# Patient Record
Sex: MALE | Race: WHITE | NOT HISPANIC OR LATINO | Employment: FULL TIME | URBAN - METROPOLITAN AREA
[De-identification: names, ages, dates, MRNs, and addresses within clinical notes are randomized per-mention and may not be internally consistent; named-entity substitution may affect disease eponyms.]

---

## 2022-04-07 ENCOUNTER — OFFICE VISIT (OUTPATIENT)
Dept: INTERNAL MEDICINE | Facility: CLINIC | Age: 47
End: 2022-04-07

## 2022-04-07 VITALS
DIASTOLIC BLOOD PRESSURE: 92 MMHG | OXYGEN SATURATION: 96 % | TEMPERATURE: 97.1 F | HEART RATE: 92 BPM | SYSTOLIC BLOOD PRESSURE: 166 MMHG | WEIGHT: 297 LBS | RESPIRATION RATE: 22 BRPM | BODY MASS INDEX: 40.23 KG/M2 | HEIGHT: 72 IN

## 2022-04-07 DIAGNOSIS — F41.9 ANXIETY AND DEPRESSION: ICD-10-CM

## 2022-04-07 DIAGNOSIS — F32.A ANXIETY AND DEPRESSION: ICD-10-CM

## 2022-04-07 DIAGNOSIS — I10 PRIMARY HYPERTENSION: Primary | ICD-10-CM

## 2022-04-07 DIAGNOSIS — K21.00 GASTROESOPHAGEAL REFLUX DISEASE WITH ESOPHAGITIS WITHOUT HEMORRHAGE: ICD-10-CM

## 2022-04-07 PROCEDURE — 99204 OFFICE O/P NEW MOD 45 MIN: CPT | Performed by: NURSE PRACTITIONER

## 2022-04-07 RX ORDER — OMEPRAZOLE 20 MG/1
20 CAPSULE, DELAYED RELEASE ORAL DAILY
COMMUNITY
End: 2022-04-07

## 2022-04-07 RX ORDER — ESCITALOPRAM OXALATE 10 MG/1
10 TABLET ORAL DAILY
Qty: 30 TABLET | Refills: 2 | Status: SHIPPED | OUTPATIENT
Start: 2022-04-07

## 2022-04-07 RX ORDER — LISINOPRIL 10 MG/1
10 TABLET ORAL DAILY
Qty: 30 TABLET | Refills: 2 | Status: SHIPPED | OUTPATIENT
Start: 2022-04-07

## 2022-04-07 RX ORDER — OMEPRAZOLE 40 MG/1
40 CAPSULE, DELAYED RELEASE ORAL DAILY
Qty: 90 CAPSULE | Refills: 0 | Status: SHIPPED | OUTPATIENT
Start: 2022-04-07

## 2022-04-07 NOTE — PROGRESS NOTES
New Patient Office Visit      Patient Name: Charles Aviles  : 1975   MRN: [id number]     Chief Complaint:    Chief Complaint   Patient presents with   • Anxiety     New patient   • Hypertension       History of Present Illness: Charles Aviles is a 46 y.o. male presents to clinic today to establish care.      Former PCP-[place] Dr. Quezada      Alcoholism  Patient was in 60 day treatment August and 2021. Patient has been sober since. Once he got out he stopped all his medications including bp medication, cholesterol medication, zoloft, and valium.       Hypertension  Patient hasn't taken medication since 2021 and bp was stable. The patient denies headaches, chest pain, dyspnea, edema, syncope, blurred vision or palpitations. He had tolerated them well in the past.     GERD  The patient reports a long history of GERD; history of Ravinder's esophagus. EGD completed . Patient has been taking omeprazole 20 mg daily otc; patient's symptoms have been controlled with once a day dosing. He was prescribed omeprazole 20 mg BID in the past. He had not seen PCP to get it covered.      Pain  Location: full length of left arm and left leg pain  Severity: mild-moderate   Onset Quality: sudden  Duration: Couple times a week; lasts seconds   Timing:  Intermittent  Chronicity: couple of weeks  Quality: sharp shooting pain   Aggravating factors: occurs mainly during sleep  Alleviating factors: goes away on its own; no therapies attempted.      Associated symptoms: intermittent numbness in tingling in left hand   Risk factors: lower back problems      Patient has complaints of depression due to family stressors.  Symptoms include feeling down and depressed, loss of interest in things they once enjoyed, change in eating, sleep disturbances, anxiousness, and hopelessness. He denies any thoughts of self-harm or plan to hurt himself.    Subjective     Review of System: Review of Systems    Constitutional: Negative for fatigue and fever.   HENT: Negative for congestion and rhinorrhea.    Respiratory: Negative for shortness of breath and wheezing.    Cardiovascular: Negative for chest pain and palpitations.   Gastrointestinal: Negative for abdominal pain, diarrhea, nausea and vomiting.   Genitourinary: Negative for dysuria.   Musculoskeletal: Negative for myalgias.   Skin: Negative for rash.   Neurological: Negative for headaches.   Hematological: Negative for adenopathy.   Psychiatric/Behavioral: Negative for dysphoric mood.          Past Medical History:   Past Medical History:   Diagnosis Date   • Anxiety    • Colon polyp    • Depression    • GERD (gastroesophageal reflux disease)    • History of medical problems    • Hypertension        Past Surgical History: History reviewed. No pertinent surgical history.    Family History:   Family History   Problem Relation Age of Onset   • Thyroid disease Mother    • Liver disease Father    • Hyperlipidemia Father    • Hypertension Father    • Arthritis Father    • Diabetes Father    • Heart disease Paternal Uncle    • Cancer Maternal Grandfather        Social History:   Social History     Socioeconomic History   • Marital status:    Tobacco Use   • Smoking status: Former Smoker     Packs/day: 1.00     Years: 15.00     Pack years: 15.00     Types: Cigarettes     Start date: 1991     Quit date: 2021     Years since quittin.6   • Smokeless tobacco: Current User     Types: Chew   • Tobacco comment: I chew snuff   Substance and Sexual Activity   • Alcohol use: Not Currently     Comment: in recovery 7 months clean   • Drug use: Never   • Sexual activity: Not Currently     Partners: Female     Birth control/protection: Condom       Medications:     Current Outpatient Medications:   •  lisinopril (PRINIVIL,ZESTRIL) 10 MG tablet, Take 1 tablet by mouth Daily., Disp: 30 tablet, Rfl: 2  •  omeprazole (priLOSEC) 40 MG capsule, Take 1 capsule by  "mouth Daily., Disp: 90 capsule, Rfl: 0    Allergies:   No Known Allergies    Objective     Physical Exam:   Vital Signs:   Vitals:    04/07/22 1429   BP: 166/92   BP Location: Right arm   Patient Position: Sitting   Cuff Size: Adult   Pulse: 92   Resp: 22   Temp: 97.1 °F (36.2 °C)   SpO2: 96%   Weight: 135 kg (297 lb)   Height: 181.6 cm (71.5\")   PainSc: 0-No pain     Body mass index is 40.85 kg/m². Patient's Body mass index is 40.85 kg/m². indicating that he is {weight categories:35177}.      Physical Exam  Constitutional:       General: He is not in acute distress.     Appearance: He is not ill-appearing.   HENT:      Head: Normocephalic.   Cardiovascular:      Rate and Rhythm: Normal rate and regular rhythm.      Heart sounds: Normal heart sounds. No murmur heard.  Pulmonary:      Breath sounds: Normal breath sounds.   Abdominal:      General: Bowel sounds are normal.      Tenderness: There is no abdominal tenderness.   Musculoskeletal:         General: No swelling or tenderness. Normal range of motion.   Lymphadenopathy:      Cervical: No cervical adenopathy.   Skin:     General: Skin is warm and dry.   Neurological:      General: No focal deficit present.      Mental Status: He is oriented to person, place, and time.   Psychiatric:         Mood and Affect: Mood normal.         Assessment / Plan      Assessment/Plan:   Diagnoses and all orders for this visit:    1. Primary hypertension (Primary)  -     lisinopril (PRINIVIL,ZESTRIL) 10 MG tablet; Take 1 tablet by mouth Daily.  Dispense: 30 tablet; Refill: 2  Your blood pressure was elevated today. Monitor at home and record. Bring to next appointment. If BP is >140/90 then f/u sooner.     2. Gastroesophageal reflux disease with esophagitis without hemorrhage  -     omeprazole (priLOSEC) 40 MG capsule; Take 1 capsule by mouth Daily.  Dispense: 90 capsule; Refill: 0  -     Ambulatory Referral to Gastroenterology         Follow Up:   Return in about 3 months (around " 7/7/2022) for Annual.    KATIE Plata  [facility]

## 2022-04-11 NOTE — PROGRESS NOTES
New Patient Office Visit      Patient Name: Charles Aviles  : 1975   MRN: [id number]     Chief Complaint:    Chief Complaint   Patient presents with   • Anxiety     New patient   • Hypertension       History of Present Illness: Charles Aviles is a 46 y.o. male presents to clinic today to establish care.      Former PCP-[place] Dr. Quezada      Alcoholism  Patient was in 60 day treatment August and 2021. Patient has been sober since. Once he got out he stopped all his medications including bp medication, cholesterol medication, zoloft, and valium.       Hypertension  Patient hasn't taken medication since 2021 and bp was stable. The patient denies headaches, chest pain, dyspnea, edema, syncope, blurred vision or palpitations. He had tolerated them well in the past.     GERD  The patient reports a long history of GERD; history of Ravinder's esophagus. EGD completed . Patient has been taking omeprazole 20 mg daily otc; patient's symptoms have been controlled with once a day dosing. He was prescribed omeprazole 20 mg BID in the past. He had not seen PCP to get it covered.      Pain  Location: full length of left arm and left leg pain  Severity: mild-moderate   Onset Quality: sudden  Duration: Couple times a week; lasts seconds   Timing:  Intermittent  Chronicity: couple of weeks  Quality: sharp shooting pain   Aggravating factors: occurs mainly during sleep  Alleviating factors: goes away on its own; no therapies attempted.      Associated symptoms: intermittent numbness in tingling in left hand   Risk factors: lower back problems      Patient has complaints of depression due to family stressors.  Symptoms include feeling down and depressed, loss of interest in things they once enjoyed, change in eating, sleep disturbances, anxiousness, and hopelessness. He denies any thoughts of self-harm or plan to hurt himself.    Subjective     Review of System: Review of Systems    Constitutional: Negative for fatigue and fever.   HENT: Negative for congestion and rhinorrhea.    Respiratory: Negative for shortness of breath and wheezing.    Cardiovascular: Negative for chest pain and palpitations.   Gastrointestinal: Negative for abdominal pain, diarrhea, nausea and vomiting.   Genitourinary: Negative for dysuria.   Musculoskeletal: Negative for myalgias.   Skin: Negative for rash.   Neurological: Negative for headaches.   Hematological: Negative for adenopathy.   Psychiatric/Behavioral: Negative for dysphoric mood.          Past Medical History:   Past Medical History:   Diagnosis Date   • Anxiety    • Colon polyp    • Depression    • GERD (gastroesophageal reflux disease)    • History of medical problems    • Hypertension        Past Surgical History: History reviewed. No pertinent surgical history.    Family History:   Family History   Problem Relation Age of Onset   • Thyroid disease Mother    • Liver disease Father    • Hyperlipidemia Father    • Hypertension Father    • Arthritis Father    • Diabetes Father    • Heart disease Paternal Uncle    • Cancer Maternal Grandfather        Social History:   Social History     Socioeconomic History   • Marital status:    Tobacco Use   • Smoking status: Former Smoker     Packs/day: 1.00     Years: 15.00     Pack years: 15.00     Types: Cigarettes     Start date: 1991     Quit date: 2021     Years since quittin.6   • Smokeless tobacco: Current User     Types: Chew   • Tobacco comment: I chew snuff   Substance and Sexual Activity   • Alcohol use: Not Currently     Comment: in recovery 7 months clean   • Drug use: Never   • Sexual activity: Not Currently     Partners: Female     Birth control/protection: Condom       Medications:     Current Outpatient Medications:   •  escitalopram (Lexapro) 10 MG tablet, Take 1 tablet by mouth Daily., Disp: 30 tablet, Rfl: 2  •  lisinopril (PRINIVIL,ZESTRIL) 10 MG tablet, Take 1 tablet by  "mouth Daily., Disp: 30 tablet, Rfl: 2  •  omeprazole (priLOSEC) 40 MG capsule, Take 1 capsule by mouth Daily., Disp: 90 capsule, Rfl: 0    Allergies:   No Known Allergies    Objective     Physical Exam:   Vital Signs:   Vitals:    04/07/22 1429   BP: 166/92   BP Location: Right arm   Patient Position: Sitting   Cuff Size: Adult   Pulse: 92   Resp: 22   Temp: 97.1 °F (36.2 °C)   SpO2: 96%   Weight: 135 kg (297 lb)   Height: 181.6 cm (71.5\")   PainSc: 0-No pain           Physical Exam  Constitutional:       General: He is not in acute distress.     Appearance: He is not ill-appearing.   HENT:      Head: Normocephalic.   Cardiovascular:      Rate and Rhythm: Normal rate and regular rhythm.      Heart sounds: Normal heart sounds. No murmur heard.  Pulmonary:      Breath sounds: Normal breath sounds.   Abdominal:      General: Bowel sounds are normal.      Tenderness: There is no abdominal tenderness.   Musculoskeletal:         General: No swelling or tenderness. Normal range of motion.   Lymphadenopathy:      Cervical: No cervical adenopathy.   Skin:     General: Skin is warm and dry.   Neurological:      General: No focal deficit present.      Mental Status: He is oriented to person, place, and time.   Psychiatric:         Mood and Affect: Mood normal.         Assessment / Plan      Assessment/Plan:   Diagnoses and all orders for this visit:    1. Primary hypertension (Primary)  -     lisinopril (PRINIVIL,ZESTRIL) 10 MG tablet; Take 1 tablet by mouth Daily.  Dispense: 30 tablet; Refill: 2  Your blood pressure was elevated today. Monitor at home and record. Bring to next appointment. If BP is >140/90 then f/u sooner.     2. Gastroesophageal reflux disease with esophagitis without hemorrhage  -     omeprazole (priLOSEC) 40 MG capsule; Take 1 capsule by mouth Daily.  Dispense: 90 capsule; Refill: 0  -     Ambulatory Referral to Gastroenterology    3. Anxiety and depression  Escitalopram 10 mg daily            Follow Up: "   Return in about 3 months (around 7/7/2022) for Annual.    KATIE Plata  [facility]